# Patient Record
Sex: FEMALE | Race: WHITE
[De-identification: names, ages, dates, MRNs, and addresses within clinical notes are randomized per-mention and may not be internally consistent; named-entity substitution may affect disease eponyms.]

---

## 2020-07-22 ENCOUNTER — HOSPITAL ENCOUNTER (OUTPATIENT)
Dept: HOSPITAL 35 - LAB | Age: 65
End: 2020-07-22
Payer: COMMERCIAL

## 2020-07-22 DIAGNOSIS — Z01.812: Primary | ICD-10-CM

## 2020-07-22 DIAGNOSIS — Z11.59: ICD-10-CM

## 2020-07-27 ENCOUNTER — HOSPITAL ENCOUNTER (OUTPATIENT)
Dept: HOSPITAL 35 - OR | Age: 65
Discharge: HOME | End: 2020-07-27
Attending: ORTHOPAEDIC SURGERY
Payer: COMMERCIAL

## 2020-07-27 VITALS — DIASTOLIC BLOOD PRESSURE: 83 MMHG | SYSTOLIC BLOOD PRESSURE: 160 MMHG

## 2020-07-27 VITALS — BODY MASS INDEX: 45.99 KG/M2 | WEIGHT: 293 LBS | HEIGHT: 67 IN

## 2020-07-27 DIAGNOSIS — E11.9: ICD-10-CM

## 2020-07-27 DIAGNOSIS — I10: ICD-10-CM

## 2020-07-27 DIAGNOSIS — M65.342: ICD-10-CM

## 2020-07-27 DIAGNOSIS — M19.90: ICD-10-CM

## 2020-07-27 DIAGNOSIS — G56.02: Primary | ICD-10-CM

## 2020-07-27 DIAGNOSIS — Z79.899: ICD-10-CM

## 2020-07-27 DIAGNOSIS — Z98.890: ICD-10-CM

## 2020-07-27 DIAGNOSIS — E78.00: ICD-10-CM

## 2020-07-27 LAB
ALBUMIN SERPL-MCNC: 3.9 G/DL (ref 3.4–5)
ALT SERPL-CCNC: 7 U/L (ref 30–65)
ANION GAP SERPL CALC-SCNC: 10 MMOL/L (ref 7–16)
AST SERPL-CCNC: 10 U/L (ref 15–37)
BILIRUB SERPL-MCNC: 0.4 MG/DL (ref 0.2–1)
BUN SERPL-MCNC: 61 MG/DL (ref 7–18)
CALCIUM SERPL-MCNC: 9.5 MG/DL (ref 8.5–10.1)
CHLORIDE SERPL-SCNC: 105 MMOL/L (ref 98–107)
CO2 SERPL-SCNC: 28 MMOL/L (ref 21–32)
CREAT SERPL-MCNC: 1.9 MG/DL (ref 0.6–1)
GLUCOSE SERPL-MCNC: 124 MG/DL (ref 74–106)
POTASSIUM SERPL-SCNC: 4 MMOL/L (ref 3.5–5.1)
PROT SERPL-MCNC: 7.8 G/DL (ref 6.4–8.2)
SODIUM SERPL-SCNC: 143 MMOL/L (ref 136–145)

## 2020-07-27 PROCEDURE — 62110: CPT

## 2020-07-27 PROCEDURE — 50101: CPT

## 2020-07-27 PROCEDURE — 62900: CPT

## 2020-07-27 PROCEDURE — 50386 REMOVE STENT VIA TRANSURETH: CPT

## 2020-07-27 PROCEDURE — 57179: CPT

## 2020-07-27 PROCEDURE — 57091: CPT

## 2020-07-27 PROCEDURE — 57006: CPT

## 2020-07-27 PROCEDURE — 56969: CPT

## 2020-07-27 PROCEDURE — 50010 RENAL EXPLORATION: CPT

## 2020-07-27 PROCEDURE — 56526: CPT

## 2020-07-27 PROCEDURE — 70005: CPT

## 2020-07-27 NOTE — O
[facility]
[place]   [address]                     OPERATIVE REPORT              
_______________________________________________________________________________
 
Name:       KATTY BUNN            Room #:         150-4       [facility]#:      [id number]                       Account #:      [id number]  
Admission:  07/27/20    Attend Phys:    Katlyn Benítez, 
Discharge:              Date of Birth:  [birth year]  
                                                          Report #: [id number]
                                                                    [id number]   
_______________________________________________________________________________
THIS REPORT FOR:  
 
cc:  Mat Mason,Mat Tomas,Katlyn COOPER MD                                      ~
CC: Mat Benítez
 
DATE OF SERVICE:  07/27/2020
 
 
PREOPERATIVE DIAGNOSIS:  Left carpal tunnel syndrome.
 
POSTOPERATIVE DIAGNOSIS:  Left carpal tunnel syndrome.
 
PROCEDURE PERFORMED:  Left endoscopic carpal tunnel release.
 
SURGEON:  Katlyn Benítez MD
 
ANESTHESIA:  General mask anesthesia.
 
ESTIMATED BLOOD LOSS:  Minimal.
 
TOURNIQUET TIME:  16 minutes.
 
COMPLICATIONS:  None.
 
CONDITION:  Stable.
 
DISPOSITION:  Recovery room.
 
INDICATIONS:  The patient is a 65-year-old female with the above-mentioned
diagnosis.  She elects for operative treatment.  The risks, benefits,
alternatives and complications were discussed including but not limited to
infection, damage to vessels or nerves, incomplete relief or worsening of any
symptoms.  Informed consent was obtained.  The correct extremity was identified
and labeled by myself after verbal confirmation of the patient as well as visual
confirmation and signed informed consent.
 
DESCRIPTION OF PROCEDURE:  The patient was brought back to the Operating Room
and placed on the operating table in supine position.  She received preoperative
antibiotics.  Tourniquet was placed over padding on the patient's left upper
extremity.  The left upper extremity was sterilely prepped and draped in the
usual fashion.  Final timeout was taken to verify correct patient, operative
procedure, operative site, all concurred.  The arm was elevated, exsanguinated
and tourniquet inflated.  The entire procedure was done with the aid of [address] [place] [address]   [id number]                     OPERATIVE REPORT              
_______________________________________________________________________________
 
Name:       KATTY BUNN            Room #:         150-4       [facility]#:      [id number]                       Account #:      [id number]  
Admission:  07/27/20    Attend Phys:    Katlyn Benítez, 
Discharge:              Date of Birth:  [birth year]  
                                                          Report #: [id number]
                                                                    [id number]   
_______________________________________________________________________________
times loupe magnification.  Next, a transverse incision was made at few
millimeters proximal to the distal wrist crease in line with ulnar border of the
palmar longus tendon.  Dissection was carried down through subcutaneous tissue
with tenotomy scissors.  The antebrachial fascia was identified and incised.  It
was then incised for a few millimeters proximal.  Next, an oblique incision was
made distal to the hook of hamate in line with the ring finger.  The fat was
elevated off the fascia.  The fascia was carefully incised.  Next, the [place]
elevator was placed through the carpal tunnel from proximal to distal to elevate
any synovial tissue off the undersurface of the transverse carpal ligament. 
Next, with the wrist in maximal extension and digital pressure distally, the
blunt trocar and cannula was inserted.  Blunt trocar was removed.  The camera
was inserted and I was able to see the nice transverse fibers of the transverse
carpal ligament.  The blade was brought in distally and the transverse carpal
ligament transected distally.  Next, the camera was inserted distally and the
transverse carpal ligament was transected proximally.  Next, camera and cannula
were withdrawn and visualized the cut ends of the transverse carpal ligament. 
Next, each wound was explored.  There was ____ muscle variant distally.  There
was approximately 1 cm of this remained distally.  This was incised under direct
visualization, so the transverse carpal ligament was released all the way from
the antebrachial fascia of the forearm, all the way through the fat in the palm.
 The nerve looked to be in excellent condition.  A Long Branch elevator was placed
through the carpal tunnel and no remnants of the transverse carpal ligament
remained.  Of note, the entire procedure was done with the aid of 3.5 loupe
magnification.  Each wound was thoroughly irrigated.  Skin was closed with 4-0
nylon suture.  The subcutaneous tissue was infiltrated with approximately 5 mL
of 0.25% Marcaine.  She was placed in a bulky dressing.  All fingers were pink
with brisk capillary refill at the conclusion of the case after deflation of
tourniquet.  All sponge and needle counts were correct.  The patient was
transferred to postoperative recovery room in stable condition.
 
 
 
 
 
 
 
 
 
 
 
 
 
 
 
                         
   By:                               
                   
D: 07/27/20 1256                           _____________________________________
T: 07/27/20 1346                           Katlyn Benítez MD        /nt

## 2020-07-27 NOTE — EKG
[facility]
[place]   [address]                     ELECTROCARDIOGRAM REPORT      
_______________________________________________________________________________
 
Name:       KATTY BUNN            Room #:         [address] [place] [address]..#:      [id number]                       Account #:      [id number]  
Admission:  20    Attend Phys:    Katlyn Benítez, 
Discharge:              Date of Birth:  [birth year]  
                                                          Report #: [id number]
                                                                    [id number]
_______________________________________________________________________________
THIS REPORT FOR:  
 
cc:  Mat Mason David J. DO Couchonnal, Luis F. MD                                            ~
THIS REPORT FOR:   //name//                          
 
                          [facility]
                                       
Test Date:    2020               Test Time:    11:25:44
Pat Name:     KATTY BUNN         Department:   
Patient ID:   [id number]            Room:         150 4
Gender:       F                        Technician:   JEET
:          1955               Requested By: Katlyn Benítez
Order Number: [id number] MD:   Rafael De La Torre
                                 Measurements
Intervals                              Axis          
Rate:         68                       P:            
IL:                                    QRS:          33
QRSD:         114                      T:            58
QT:           396                                    
QTc:          422                                    
                           Interpretive Statements
Sinus rhythm
Incomplete left bundle branch block
Compared to ECG 10/16/2017 09:25:35
Electronically Signed On 2020 11:38:08 CDT by Rafael De La Torre
[email or web address]
 
 
 
 
 
 
 
 
 
 
 
 
 
 
 
 
  <ELECTRONICALLY SIGNED>
   By: Rafael De La Torre MD        
  20     1138
D: 20 1125                           _____________________________________
T: 20 1125                           Rafael De La Torre MD          /EPI